# Patient Record
Sex: FEMALE | Race: BLACK OR AFRICAN AMERICAN | NOT HISPANIC OR LATINO | URBAN - METROPOLITAN AREA
[De-identification: names, ages, dates, MRNs, and addresses within clinical notes are randomized per-mention and may not be internally consistent; named-entity substitution may affect disease eponyms.]

---
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
## 2022-12-24 ENCOUNTER — EMERGENCY (EMERGENCY)
Facility: HOSPITAL | Age: 22
LOS: 0 days | Discharge: HOME | End: 2022-12-24
Payer: OTHER MISCELLANEOUS

## 2022-12-24 VITALS
RESPIRATION RATE: 17 BRPM | HEART RATE: 70 BPM | OXYGEN SATURATION: 99 % | TEMPERATURE: 98 F | SYSTOLIC BLOOD PRESSURE: 120 MMHG | DIASTOLIC BLOOD PRESSURE: 67 MMHG

## 2022-12-24 VITALS
TEMPERATURE: 99 F | SYSTOLIC BLOOD PRESSURE: 107 MMHG | WEIGHT: 134.48 LBS | OXYGEN SATURATION: 99 % | RESPIRATION RATE: 18 BRPM | HEIGHT: 65 IN | HEART RATE: 88 BPM | DIASTOLIC BLOOD PRESSURE: 71 MMHG

## 2022-12-24 DIAGNOSIS — M25.511 PAIN IN RIGHT SHOULDER: ICD-10-CM

## 2022-12-24 DIAGNOSIS — Z91.010 ALLERGY TO PEANUTS: ICD-10-CM
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
## 2022-12-24 DIAGNOSIS — M54.2 CERVICALGIA: ICD-10-CM
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
## 2022-12-24 DIAGNOSIS — Z91.013 ALLERGY TO SEAFOOD: ICD-10-CM
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
## 2022-12-24 PROCEDURE — 99282 EMERGENCY DEPT VISIT SF MDM: CPT

## 2022-12-24 RX ORDER — METHOCARBAMOL 500 MG/1
2 TABLET, FILM COATED ORAL
Qty: 18 | Refills: 0
Start: 2022-12-24 | End: 2022-12-26

## 2022-12-24 RX ORDER — KETOROLAC TROMETHAMINE 30 MG/ML
30 SYRINGE (ML) INJECTION ONCE
Refills: 0 | Status: DISCONTINUED | OUTPATIENT
Start: 2022-12-24 | End: 2022-12-24

## 2022-12-24 RX ORDER — ACETAMINOPHEN 500 MG
1 TABLET ORAL
Qty: 18 | Refills: 0
Start: 2022-12-24 | End: 2022-12-26

## 2022-12-24 RX ORDER — IBUPROFEN 200 MG
1 TABLET ORAL
Qty: 15 | Refills: 0
Start: 2022-12-24 | End: 2022-12-28

## 2022-12-24 RX ORDER — ACETAMINOPHEN 500 MG
650 TABLET ORAL ONCE
Refills: 0 | Status: COMPLETED | OUTPATIENT
Start: 2022-12-24 | End: 2022-12-24

## 2022-12-24 RX ORDER — LIDOCAINE 4 G/100G
1 CREAM TOPICAL ONCE
Refills: 0 | Status: COMPLETED | OUTPATIENT
Start: 2022-12-24 | End: 2022-12-24

## 2022-12-24 RX ADMIN — Medication 650 MILLIGRAM(S): at 16:21

## 2022-12-24 RX ADMIN — LIDOCAINE 1 PATCH: 4 CREAM TOPICAL at 16:22

## 2022-12-24 RX ADMIN — Medication 650 MILLIGRAM(S): at 16:53

## 2022-12-24 RX ADMIN — Medication 30 MILLIGRAM(S): at 16:21

## 2022-12-24 RX ADMIN — Medication 30 MILLIGRAM(S): at 16:53

## 2022-12-24 NOTE — ED PROVIDER NOTE - PATIENT PORTAL LINK FT
You can access the FollowMyHealth Patient Portal offered by [facility] by registering at the following website: [email or web address]. By joining [facility]s FollowMyHealth portal, you will also be able to view your health information using other applications (apps) compatible with our system.

## 2022-12-24 NOTE — ED PROVIDER NOTE - OBJECTIVE STATEMENT
22-year-old female with no significant past medical history presents emergency room for neck and shoulder pain.  Patient states she works as a  and last night had 2 large jugs that they needed fall onto the back of the right side of her neck and right shoulder.  Patient states she did not want to fill an incident report at the time because she felt okay but this morning woke up with right-sided neck and shoulder pain.  Reports 5 out of 10 pain, did not take pain medications.  Denies numbness, tingling, headache, chest pain or shortness of breath.  Patient denies pregnancy.

## 2022-12-24 NOTE — ED PROVIDER NOTE - NS ED ROS FT
Constitutional: (-) Fever, (-) Chills  Skin: (-) Rashes  Eyes: (-) Visual changes, (-) Discharge, (-) Redness  Ears: (-) Hearing loss, (-)Tinnitus, (-) Ear pain  Nose: (-) Nasal congestion, (-) Runny nose  Mouth/Throat: (-) Sore throat  CV: (-) Chest pain  Resp: (-) Cough, (-) Shortness of breath, (-) Dyspnea on Exertion, (-) Wheezing  GI: (-) Abdominal pain, (-) Nausea, (-) Vomiting, (-) Diarrhea  : (-) Dysuria   MSK: (+) Myalgias, (+) Neck pain, (-) Neck pain  Neuro: (-) Headache

## 2022-12-24 NOTE — ED ADULT NURSE NOTE - NSICDXFAMILYHX_GEN_ALL_CORE_FT
FAMILY HISTORY:  Father  Still living? Unknown  Family history of diabetes mellitus (DM), Age at diagnosis: Age Unknown    Mother  Still living? Unknown  FH: pulmonary embolism, Age at diagnosis: Age Unknown

## 2022-12-24 NOTE — ED PROVIDER NOTE - CARE PROVIDER_API CALL
Car Sousa (DO)  Orthopaedic Surgery Surgery  [address] [facility], [address] [place] [address]  Phone: [phone]  Fax: [phone]  Follow Up Time:

## 2022-12-24 NOTE — ED ADULT TRIAGE NOTE - CHIEF COMPLAINT QUOTE
patient states 2 empty lemonade drug fell on back of her neck last night, today c/o pain mostly right shoulder and neck. LMP 12/11/22.

## 2022-12-24 NOTE — ED PROVIDER NOTE - NSFOLLOWUPCLINICS_GEN_ALL_ED_FT
[facility] Orthopedic Surgery  Orthopedic Surgery  [address], 3rd & [address]th [place] [address]  Phone: [phone]  Fax:     [facility]  Orthopedic Surgery  [address] [address]  Phone: [phone]  Fax:

## 2022-12-24 NOTE — ED ADULT NURSE NOTE - OBJECTIVE STATEMENT
patient is A&Ox4. Breathing unlabored on RA. denies any PMH/PSH. patient complaining of right shoulder and neck pain. states "two big empty plastic jugs fell on my neck last night". patient reports waking up with right shoulder and neck pain. denies any radiation of pain. complaining of soreness to the area. patient noted with active ROM in all extremities. denies any numbness/tingling, back pain, weakness, fever, chills, cp, abdominal pain, headache. denies any other symptoms at this time. denies pregnancy. LMP 12/11/22.

## 2022-12-24 NOTE — ED PROVIDER NOTE - CLINICAL SUMMARY MEDICAL DECISION MAKING FREE TEXT BOX
22-year-old female with no significant past medical history presents emergency room for neck and shoulder pain. Reports neck pain after 2 large jugs fell on her last night.  Reports 5 out of 10 pain today, did not take pain medications.  Denies numbness, tingling, headache, chest pain or shortness of breath. Vital signs stable, R sided paraspinal tenderness, no neuro deficit, neurovascularly intact. No midline tenderness. Likely MSK pain vs contusion, will give meds and dc as patient has her ride waiting.

## 2022-12-24 NOTE — ED PROVIDER NOTE - NSFOLLOWUPINSTRUCTIONS_ED_ALL_ED_FT
Today you were seen in the ER for neck pain.     Take Motrin 600 mg every 8 hours as needed for moderate pain -- take with food.    Take Tylenol 650mg (Two 325 mg pills) every 4-6 hours as needed for pain.    A lidocaine patch was placed and can stay on for 12 hours.     A prescription for muscle relaxant was sent to the pharmacy. Read all instructions and take as directed.     SEEK IMMEDIATE MEDICAL CARE IF YOU HAVE ANY OF THE FOLLOWING SYMPTOMS: bowel or bladder control problems, unusual weakness or numbness in your arms or legs, nausea or vomiting, abdominal pain, fever, headache, dizziness/lightheadedness.    Advance activity as tolerated.     Continue all previously prescribed medications as directed unless otherwise instructed.     Follow up with your primary care physician in 48-72 hours- bring copies of your results.